# Patient Record
Sex: MALE | Race: WHITE | NOT HISPANIC OR LATINO | Employment: UNEMPLOYED | URBAN - NONMETROPOLITAN AREA
[De-identification: names, ages, dates, MRNs, and addresses within clinical notes are randomized per-mention and may not be internally consistent; named-entity substitution may affect disease eponyms.]

---

## 2017-08-16 ENCOUNTER — APPOINTMENT (OUTPATIENT)
Dept: ULTRASOUND IMAGING | Facility: HOSPITAL | Age: 62
End: 2017-08-16

## 2017-08-21 ENCOUNTER — HOSPITAL ENCOUNTER (OUTPATIENT)
Dept: ULTRASOUND IMAGING | Facility: HOSPITAL | Age: 62
End: 2017-08-21

## 2017-08-24 ENCOUNTER — APPOINTMENT (OUTPATIENT)
Dept: ULTRASOUND IMAGING | Facility: HOSPITAL | Age: 62
End: 2017-08-24

## 2017-08-24 ENCOUNTER — HOSPITAL ENCOUNTER (OUTPATIENT)
Dept: ULTRASOUND IMAGING | Facility: HOSPITAL | Age: 62
Discharge: HOME | End: 2017-08-24
Attending: FAMILY MEDICINE | Admitting: FAMILY MEDICINE

## 2017-08-24 ENCOUNTER — TRANSCRIBE ORDERS (OUTPATIENT)
Dept: GENERAL RADIOLOGY | Facility: HOSPITAL | Age: 62
End: 2017-08-24

## 2017-08-24 DIAGNOSIS — R31.0 GROSS HEMATURIA: ICD-10-CM

## 2017-08-24 DIAGNOSIS — R31.0 GROSS HEMATURIA: Primary | ICD-10-CM

## 2017-08-24 PROCEDURE — 76775 US EXAM ABDO BACK WALL LIM: CPT

## 2018-10-12 ENCOUNTER — HOSPITAL ENCOUNTER (OUTPATIENT)
Dept: MRI IMAGING | Facility: HOSPITAL | Age: 63
Discharge: HOME | End: 2018-10-12
Admitting: OPHTHALMOLOGY

## 2018-10-12 DIAGNOSIS — H46.01 OPTIC PAPILLITIS OF RIGHT EYE: ICD-10-CM

## 2018-10-12 PROCEDURE — 70543 MRI ORBT/FAC/NCK W/O &W/DYE: CPT

## 2018-10-12 PROCEDURE — A9576 INJ PROHANCE MULTIPACK: HCPCS | Performed by: OPHTHALMOLOGY

## 2018-10-12 PROCEDURE — 25010000002 GADOTERIDOL PER 1 ML: Performed by: OPHTHALMOLOGY

## 2018-10-12 RX ADMIN — GADOTERIDOL 20 ML: 279.3 INJECTION, SOLUTION INTRAVENOUS at 13:28

## 2020-03-24 ENCOUNTER — APPOINTMENT (OUTPATIENT)
Dept: ONCOLOGY | Facility: CLINIC | Age: 65
End: 2020-03-24

## 2020-03-24 ENCOUNTER — APPOINTMENT (OUTPATIENT)
Dept: ONCOLOGY | Facility: HOSPITAL | Age: 65
End: 2020-03-24

## 2022-03-24 ENCOUNTER — HOSPITAL ENCOUNTER (OUTPATIENT)
Dept: ULTRASOUND IMAGING | Facility: HOSPITAL | Age: 67
End: 2022-03-24

## 2022-03-31 ENCOUNTER — HOSPITAL ENCOUNTER (OUTPATIENT)
Dept: ULTRASOUND IMAGING | Facility: HOSPITAL | Age: 67
Discharge: HOME | End: 2022-03-31
Admitting: NURSE PRACTITIONER

## 2022-03-31 DIAGNOSIS — K42.9 UMBILICAL HERNIA WITHOUT OBSTRUCTION AND WITHOUT GANGRENE: ICD-10-CM

## 2022-03-31 PROCEDURE — 76705 ECHO EXAM OF ABDOMEN: CPT

## 2022-04-15 ENCOUNTER — OFFICE VISIT (OUTPATIENT)
Dept: SURGERY | Facility: CLINIC | Age: 67
End: 2022-04-15

## 2022-04-15 VITALS
WEIGHT: 214 LBS | SYSTOLIC BLOOD PRESSURE: 100 MMHG | TEMPERATURE: 97.4 F | HEART RATE: 93 BPM | HEIGHT: 74 IN | OXYGEN SATURATION: 94 % | DIASTOLIC BLOOD PRESSURE: 70 MMHG | BODY MASS INDEX: 27.46 KG/M2

## 2022-04-15 DIAGNOSIS — K42.9 UMBILICAL HERNIA WITHOUT OBSTRUCTION AND WITHOUT GANGRENE: Primary | ICD-10-CM

## 2022-04-15 PROCEDURE — 99203 OFFICE O/P NEW LOW 30 MIN: CPT | Performed by: SURGERY

## 2022-04-15 RX ORDER — OMEPRAZOLE 20 MG/1
20 TABLET, DELAYED RELEASE ORAL DAILY
COMMUNITY

## 2022-04-15 RX ORDER — IBUPROFEN 800 MG/1
800 TABLET ORAL EVERY 8 HOURS SCHEDULED
COMMUNITY

## 2022-04-15 RX ORDER — CETIRIZINE HYDROCHLORIDE 10 MG/1
1 TABLET ORAL DAILY
COMMUNITY

## 2022-04-15 RX ORDER — BUPIVACAINE HCL/0.9 % NACL/PF 0.1 %
2 PLASTIC BAG, INJECTION (ML) EPIDURAL ONCE
Status: CANCELLED | OUTPATIENT
Start: 2022-05-04 | End: 2022-04-15

## 2022-04-15 RX ORDER — LISINOPRIL 10 MG/1
10 TABLET ORAL DAILY
COMMUNITY

## 2022-04-15 RX ORDER — SIMVASTATIN 20 MG
TABLET ORAL EVERY 24 HOURS
COMMUNITY

## 2022-04-15 RX ORDER — CITALOPRAM 20 MG/1
20 TABLET ORAL 2 TIMES DAILY
COMMUNITY

## 2022-04-15 NOTE — PROGRESS NOTES
Chief Complaint   Patient presents with   • Consult     Ref: Bethanycorinne, hernia        HPI  67 year old man with a painful but reducible umbilical hernia. No hx of incarceration or intestinal obstruction.   Past Medical History:   Diagnosis Date   • Acid reflux    • Depression    • Heartburn        Past Surgical History:   Procedure Laterality Date   • DENTAL PROCEDURE      full mouth extraction   • EYE SURGERY Bilateral    • MIDDLE EAR SURGERY Left          Current Outpatient Medications:   •  cetirizine (zyrTEC) 10 MG tablet, Take 1 tablet by mouth Daily., Disp: , Rfl:   •  citalopram (CeleXA) 20 MG tablet, 1 tablet, Disp: , Rfl:   •  ibuprofen (ADVIL,MOTRIN) 800 MG tablet, Every 8 (Eight) Hours., Disp: , Rfl:   •  lisinopril (PRINIVIL,ZESTRIL) 10 MG tablet, Daily., Disp: , Rfl:   •  omeprazole OTC (PrilOSEC OTC) 20 MG EC tablet, Daily., Disp: , Rfl:   •  simvastatin (ZOCOR) 20 MG tablet, Daily., Disp: , Rfl:     Allergies   Allergen Reactions   • Meperidine Unknown - High Severity   • Morphine Unknown - High Severity   • Propoxyphene Unknown - High Severity       Family History   Problem Relation Age of Onset   • Colon cancer Mother    • Early death Father    • Heart attack Father    • Dementia Sister    • Obesity Sister    • Hyperlipidemia Brother    • Memory loss Brother    • Heart block Brother    • Heart disease Brother    • Hyperlipidemia Brother    • Hypertension Brother    • Heart attack Brother    • Heart block Brother    • No Known Problems Brother    • Early death Brother    • Colon cancer Brother    • Drug abuse Brother    • Alcohol abuse Brother    • Alcohol abuse Brother    • Liver cancer Brother    • Liver cancer Brother    • Alcohol abuse Brother        Social History     Socioeconomic History   • Marital status:    Tobacco Use   • Smoking status: Current Every Day Smoker     Packs/day: 1.00     Types: Cigarettes   • Smokeless tobacco: Never Used   Vaping Use   • Vaping Use: Never used   •  Passive vaping exposure: Yes   Substance and Sexual Activity   • Alcohol use: Not Currently     Comment: none in 25 years   • Drug use: Not Currently     Types: Oxycodone     Comment: none in 5 years   • Sexual activity: Defer       Review of Systems   Constitutional: Negative for activity change, appetite change, chills and fever.   HENT: Negative for hearing loss, nosebleeds and trouble swallowing.    Cardiovascular: Negative for chest pain, palpitations and leg swelling.   Gastrointestinal: Positive for abdominal pain. Negative for abdominal distention, anal bleeding, blood in stool, constipation, diarrhea, nausea, rectal pain and vomiting.        Heartburn   Endocrine: Negative for cold intolerance, heat intolerance, polydipsia and polyuria.   Genitourinary: Negative for decreased urine volume, difficulty urinating, dysuria, enuresis, frequency, hematuria and urgency.   Musculoskeletal: Negative for arthralgias, back pain, gait problem, myalgias and neck pain.   Skin: Negative for pallor, rash and wound.   Allergic/Immunologic: Negative for immunocompromised state.   Neurological: Negative for dizziness, seizures, weakness, light-headedness, numbness and headaches.   Psychiatric/Behavioral: Negative for agitation and behavioral problems. The patient is not nervous/anxious.         Depression   All other systems reviewed and are negative.      Physical Exam  Vitals reviewed.   Constitutional:       Appearance: Normal appearance.   HENT:      Head: Normocephalic and atraumatic.   Cardiovascular:      Rate and Rhythm: Normal rate and regular rhythm.      Pulses: Normal pulses.   Pulmonary:      Effort: Pulmonary effort is normal. No respiratory distress.   Abdominal:      General: Abdomen is flat.      Palpations: Abdomen is soft.      Hernia: A hernia (UH is noted) is present.   Musculoskeletal:         General: Normal range of motion.      Cervical back: Normal range of motion and neck supple.   Skin:      "General: Skin is warm and dry.   Neurological:      General: No focal deficit present.      Mental Status: He is alert and oriented to person, place, and time.           ASSESSMENT    Diagnoses and all orders for this visit:    1. Umbilical hernia without obstruction and without gangrene (Primary)  -     Case Request; Standing  -     Case Request    Other orders  -     Follow Anesthesia Guidelines / Standing Orders; Future  -     Provide Chlorhexidine Skin Prep Wipes and Instructions; Future        PLAN    1.Open UH repair with mesh is planned.    The following were discussed with the patient/family:    What are the indications that have led your doctor to the opinion that an operation is necessary?    A symptomatic bulge is present for which operation has been suggested.    What, if any, alternative treatments are available for your condition?    Alternatives to surgery have been explained including watchful waiting, noting that patients who are asymptomatic or \"minimally symptomatic\" may be managed without surgical intervention.    What will be the likely result if you don't have the operation?    Hernias may grow in size, or become tender, incarcerated, or cause intestinal obstruction. While the risk of these events is low, the risk with symptomatic hernias is higher.     What are the basic procedures involved in the operation?    A small incision or incisions are made and the defect is repaired and supported with permanent mesh.     What are the risks?    There are risks of bleeding, infection requiring antibiotics and possibly further surgery, injury to nearby structures, nerve injury, wound complications, recurrence of hernia. The risk of chronic pain may be as high as 10%, although the risk debilitating pain is approximately 2%. Urinary retention requiring catheterization may occur due to spasm of the bladder muscles in 1 in 100, and is more common in elderly males.   Events such as severe bleeding, the need " for blood transfusion(s), heart irregularity or stoppage may occur, but are uncommon.  Bleeding is more common if  blood thinning drugs (such as Warfarin, Asprin, Clopidogrel or Dipyridamole)  are taken. Blood clot in the leg (DVT) causing pain and swelling is possible. In rare cases part of the clot may break off and go to the lungs.   Smoking slows wound healing and affects  the heart, lungs and circulation. Giving up smoking more than 2 weeks before the operation will help reduce the risk.  Any complication may require a prolonged hospitalization, a modified incision or additional surgery.    How is the operation expected to improve your health or quality of life?    Operations will decrease risks of serious events. It will relieve the discomfort of bulging.    Is hospitalization necessary and, if so, how long can you expect to be hospitalized?    The operation is usually performed on an outpatient basis under general anesthesia. Occasionally, overnight stay is necessary due to medical co-morbidities, the nature of the operation, or pain control.    What can you expect during your recovery period?    Incisional pain controlled is controlled with a combination of narcotic and non-narcotic oral pain medicines. The incisional areas may become swollen and bruised.       When can you expect to resume normal activities?    Activities (except lifting and straining) may be resumed within a few days. There is to be no lifting over 5 pounds for 6 weeks.    Are there likely to be residual effects from the operation?    Usually none, although pain and numbness are possible.     All questions were answered. The patient agrees to operation.                This document has been electronically signed by Ian Ruffin MD on April 15, 2022 18:30 CDT

## 2022-04-20 ENCOUNTER — PATIENT ROUNDING (BHMG ONLY) (OUTPATIENT)
Dept: SURGERY | Facility: CLINIC | Age: 67
End: 2022-04-20

## 2022-04-20 NOTE — PROGRESS NOTES
"April 20, 2022    Hello, may I speak with Eliseo Owens? This is Eliseo.     My name is Tiffani Collier    I am a Medical Assistant with [facility]    Before we get started may I verify your date of birth? 1955 Date Of Birth Verified.    I am calling to officially welcome you to our practice and ask about your recent visit. Is this a good time to talk? Yes.    Tell me about your visit with us. What things went well? \"Everything went well.\"    We're always looking for ways to make our patients' experiences even better. Do you have recommendations on ways we may improve? No.    Overall were you satisfied with your first visit to our practice? Yes.     I appreciate you taking the time to speak with me today. Is there anything else I can do for you?  No.      Thank you, and have a great day.    Patient is scheduled for Surgery. Patient instructed to call the office with any questions or concerns.    "

## 2022-05-02 ENCOUNTER — PRE-ADMISSION TESTING (OUTPATIENT)
Dept: PREADMISSION TESTING | Facility: HOSPITAL | Age: 67
End: 2022-05-02

## 2022-05-02 ENCOUNTER — LAB (OUTPATIENT)
Dept: LAB | Facility: HOSPITAL | Age: 67
End: 2022-05-02

## 2022-05-02 VITALS
OXYGEN SATURATION: 95 % | WEIGHT: 217 LBS | HEIGHT: 74 IN | BODY MASS INDEX: 27.85 KG/M2 | DIASTOLIC BLOOD PRESSURE: 68 MMHG | SYSTOLIC BLOOD PRESSURE: 100 MMHG | RESPIRATION RATE: 16 BRPM | HEART RATE: 78 BPM

## 2022-05-02 DIAGNOSIS — Z01.818 PREOP TESTING: Primary | ICD-10-CM

## 2022-05-02 LAB
MRSA DNA SPEC QL NAA+PROBE: NEGATIVE
SARS-COV-2 N GENE RESP QL NAA+PROBE: NOT DETECTED

## 2022-05-02 PROCEDURE — C9803 HOPD COVID-19 SPEC COLLECT: HCPCS

## 2022-05-02 PROCEDURE — 93010 ELECTROCARDIOGRAM REPORT: CPT | Performed by: INTERNAL MEDICINE

## 2022-05-02 PROCEDURE — 93005 ELECTROCARDIOGRAM TRACING: CPT

## 2022-05-02 PROCEDURE — 87635 SARS-COV-2 COVID-19 AMP PRB: CPT

## 2022-05-02 PROCEDURE — 87641 MR-STAPH DNA AMP PROBE: CPT

## 2022-05-02 RX ORDER — ASPIRIN 81 MG/1
81 TABLET ORAL DAILY
COMMUNITY

## 2022-05-02 RX ORDER — SODIUM CHLORIDE, SODIUM GLUCONATE, SODIUM ACETATE, POTASSIUM CHLORIDE AND MAGNESIUM CHLORIDE 526; 502; 368; 37; 30 MG/100ML; MG/100ML; MG/100ML; MG/100ML; MG/100ML
1000 INJECTION, SOLUTION INTRAVENOUS CONTINUOUS PRN
Status: CANCELLED | OUTPATIENT
Start: 2022-05-04

## 2022-05-04 ENCOUNTER — ANESTHESIA (OUTPATIENT)
Dept: PERIOP | Facility: HOSPITAL | Age: 67
End: 2022-05-04

## 2022-05-04 ENCOUNTER — ANESTHESIA EVENT (OUTPATIENT)
Dept: PERIOP | Facility: HOSPITAL | Age: 67
End: 2022-05-04

## 2022-05-04 ENCOUNTER — HOSPITAL ENCOUNTER (EMERGENCY)
Facility: HOSPITAL | Age: 67
Discharge: HOME | End: 2022-05-05
Attending: FAMILY MEDICINE | Admitting: FAMILY MEDICINE

## 2022-05-04 ENCOUNTER — HOSPITAL ENCOUNTER (OUTPATIENT)
Facility: HOSPITAL | Age: 67
Setting detail: HOSPITAL OUTPATIENT SURGERY
Discharge: HOME | End: 2022-05-04
Attending: SURGERY | Admitting: SURGERY

## 2022-05-04 VITALS
OXYGEN SATURATION: 92 % | DIASTOLIC BLOOD PRESSURE: 65 MMHG | TEMPERATURE: 97.4 F | SYSTOLIC BLOOD PRESSURE: 122 MMHG | HEIGHT: 74 IN | WEIGHT: 213.41 LBS | BODY MASS INDEX: 27.39 KG/M2 | RESPIRATION RATE: 18 BRPM | HEART RATE: 89 BPM

## 2022-05-04 DIAGNOSIS — R33.8 ACUTE URINARY RETENTION: Primary | ICD-10-CM

## 2022-05-04 DIAGNOSIS — K42.9 UMBILICAL HERNIA WITHOUT OBSTRUCTION AND WITHOUT GANGRENE: Primary | ICD-10-CM

## 2022-05-04 LAB
AMPHET+METHAMPHET UR QL: NEGATIVE
AMPHETAMINES UR QL: NEGATIVE
BARBITURATES UR QL SCN: NEGATIVE
BENZODIAZ UR QL SCN: NEGATIVE
BUPRENORPHINE SERPL-MCNC: NEGATIVE NG/ML
CANNABINOIDS SERPL QL: NEGATIVE
COCAINE UR QL: NEGATIVE
METHADONE UR QL SCN: NEGATIVE
OPIATES UR QL: NEGATIVE
OXYCODONE UR QL SCN: NEGATIVE
PCP UR QL SCN: NEGATIVE
PROPOXYPH UR QL: NEGATIVE
TRICYCLICS UR QL SCN: NEGATIVE

## 2022-05-04 PROCEDURE — 49585 PR REPAIR UMBILICAL HERN,5+Y/O,REDUC: CPT | Performed by: SURGERY

## 2022-05-04 PROCEDURE — 25010000002 SUCCINYLCHOLINE PER 20 MG: Performed by: NURSE ANESTHETIST, CERTIFIED REGISTERED

## 2022-05-04 PROCEDURE — 94640 AIRWAY INHALATION TREATMENT: CPT

## 2022-05-04 PROCEDURE — 25010000002 PROPOFOL 10 MG/ML EMULSION: Performed by: NURSE ANESTHETIST, CERTIFIED REGISTERED

## 2022-05-04 PROCEDURE — 99283 EMERGENCY DEPT VISIT LOW MDM: CPT

## 2022-05-04 PROCEDURE — 80306 DRUG TEST PRSMV INSTRMNT: CPT | Performed by: ANESTHESIOLOGY

## 2022-05-04 PROCEDURE — 0 LIDOCAINE 1 % SOLUTION: Performed by: NURSE ANESTHETIST, CERTIFIED REGISTERED

## 2022-05-04 PROCEDURE — 25010000002 CEFAZOLIN PER 500 MG: Performed by: SURGERY

## 2022-05-04 PROCEDURE — 25010000002 MIDAZOLAM PER 1 MG: Performed by: NURSE ANESTHETIST, CERTIFIED REGISTERED

## 2022-05-04 PROCEDURE — 25010000002 ONDANSETRON PER 1 MG: Performed by: NURSE ANESTHETIST, CERTIFIED REGISTERED

## 2022-05-04 PROCEDURE — 25010000002 FENTANYL CITRATE (PF) 50 MCG/ML SOLUTION: Performed by: NURSE ANESTHETIST, CERTIFIED REGISTERED

## 2022-05-04 PROCEDURE — 25010000002 NEOSTIGMINE 10 MG/10ML SOLUTION: Performed by: NURSE ANESTHETIST, CERTIFIED REGISTERED

## 2022-05-04 RX ORDER — LIDOCAINE HYDROCHLORIDE 20 MG/ML
INJECTION, SOLUTION INFILTRATION; PERINEURAL AS NEEDED
Status: DISCONTINUED | OUTPATIENT
Start: 2022-05-04 | End: 2022-05-04 | Stop reason: SURG

## 2022-05-04 RX ORDER — ALBUTEROL SULFATE 2.5 MG/3ML
2.5 SOLUTION RESPIRATORY (INHALATION) ONCE
Status: COMPLETED | OUTPATIENT
Start: 2022-05-04 | End: 2022-05-04

## 2022-05-04 RX ORDER — FENTANYL CITRATE 50 UG/ML
INJECTION, SOLUTION INTRAMUSCULAR; INTRAVENOUS AS NEEDED
Status: DISCONTINUED | OUTPATIENT
Start: 2022-05-04 | End: 2022-05-04 | Stop reason: SURG

## 2022-05-04 RX ORDER — BUPIVACAINE HCL/0.9 % NACL/PF 0.1 %
2 PLASTIC BAG, INJECTION (ML) EPIDURAL ONCE
Status: COMPLETED | OUTPATIENT
Start: 2022-05-04 | End: 2022-05-04

## 2022-05-04 RX ORDER — PROPOFOL 10 MG/ML
VIAL (ML) INTRAVENOUS AS NEEDED
Status: DISCONTINUED | OUTPATIENT
Start: 2022-05-04 | End: 2022-05-04 | Stop reason: SURG

## 2022-05-04 RX ORDER — SODIUM CHLORIDE, SODIUM GLUCONATE, SODIUM ACETATE, POTASSIUM CHLORIDE AND MAGNESIUM CHLORIDE 526; 502; 368; 37; 30 MG/100ML; MG/100ML; MG/100ML; MG/100ML; MG/100ML
1000 INJECTION, SOLUTION INTRAVENOUS CONTINUOUS PRN
Status: DISCONTINUED | OUTPATIENT
Start: 2022-05-04 | End: 2022-05-04 | Stop reason: HOSPADM

## 2022-05-04 RX ORDER — HYDRALAZINE HYDROCHLORIDE 20 MG/ML
5 INJECTION INTRAMUSCULAR; INTRAVENOUS
Status: DISCONTINUED | OUTPATIENT
Start: 2022-05-04 | End: 2022-05-04 | Stop reason: HOSPADM

## 2022-05-04 RX ORDER — LABETALOL HYDROCHLORIDE 5 MG/ML
5 INJECTION, SOLUTION INTRAVENOUS
Status: DISCONTINUED | OUTPATIENT
Start: 2022-05-04 | End: 2022-05-04 | Stop reason: HOSPADM

## 2022-05-04 RX ORDER — NEOSTIGMINE METHYLSULFATE 1 MG/ML
INJECTION, SOLUTION INTRAVENOUS AS NEEDED
Status: DISCONTINUED | OUTPATIENT
Start: 2022-05-04 | End: 2022-05-04 | Stop reason: SURG

## 2022-05-04 RX ORDER — NALOXONE HCL 0.4 MG/ML
0.4 VIAL (ML) INJECTION AS NEEDED
Status: DISCONTINUED | OUTPATIENT
Start: 2022-05-04 | End: 2022-05-04 | Stop reason: HOSPADM

## 2022-05-04 RX ORDER — ALBUTEROL SULFATE 2.5 MG/3ML
2.5 SOLUTION RESPIRATORY (INHALATION) ONCE AS NEEDED
Status: DISCONTINUED | OUTPATIENT
Start: 2022-05-04 | End: 2022-05-04 | Stop reason: HOSPADM

## 2022-05-04 RX ORDER — EPHEDRINE SULFATE 50 MG/ML
INJECTION, SOLUTION INTRAVENOUS AS NEEDED
Status: DISCONTINUED | OUTPATIENT
Start: 2022-05-04 | End: 2022-05-04 | Stop reason: SURG

## 2022-05-04 RX ORDER — ONDANSETRON 2 MG/ML
INJECTION INTRAMUSCULAR; INTRAVENOUS AS NEEDED
Status: DISCONTINUED | OUTPATIENT
Start: 2022-05-04 | End: 2022-05-04 | Stop reason: SURG

## 2022-05-04 RX ORDER — HYDROCODONE BITARTRATE AND ACETAMINOPHEN 7.5; 325 MG/1; MG/1
1 TABLET ORAL EVERY 4 HOURS PRN
Qty: 15 TABLET | Refills: 0 | Status: SHIPPED | OUTPATIENT
Start: 2022-05-04

## 2022-05-04 RX ORDER — BUPIVACAINE HYDROCHLORIDE 2.5 MG/ML
INJECTION, SOLUTION EPIDURAL; INFILTRATION; INTRACAUDAL AS NEEDED
Status: DISCONTINUED | OUTPATIENT
Start: 2022-05-04 | End: 2022-05-04 | Stop reason: HOSPADM

## 2022-05-04 RX ORDER — MIDAZOLAM HYDROCHLORIDE 1 MG/ML
INJECTION INTRAMUSCULAR; INTRAVENOUS AS NEEDED
Status: DISCONTINUED | OUTPATIENT
Start: 2022-05-04 | End: 2022-05-04 | Stop reason: SURG

## 2022-05-04 RX ORDER — ONDANSETRON 2 MG/ML
4 INJECTION INTRAMUSCULAR; INTRAVENOUS ONCE AS NEEDED
Status: DISCONTINUED | OUTPATIENT
Start: 2022-05-04 | End: 2022-05-04 | Stop reason: HOSPADM

## 2022-05-04 RX ORDER — SUCCINYLCHOLINE CHLORIDE 20 MG/ML
INJECTION INTRAMUSCULAR; INTRAVENOUS AS NEEDED
Status: DISCONTINUED | OUTPATIENT
Start: 2022-05-04 | End: 2022-05-04 | Stop reason: SURG

## 2022-05-04 RX ORDER — ROCURONIUM BROMIDE 10 MG/ML
INJECTION, SOLUTION INTRAVENOUS AS NEEDED
Status: DISCONTINUED | OUTPATIENT
Start: 2022-05-04 | End: 2022-05-04 | Stop reason: SURG

## 2022-05-04 RX ORDER — ACETAMINOPHEN 650 MG/1
650 SUPPOSITORY RECTAL ONCE AS NEEDED
Status: DISCONTINUED | OUTPATIENT
Start: 2022-05-04 | End: 2022-05-04 | Stop reason: HOSPADM

## 2022-05-04 RX ORDER — ACETAMINOPHEN 325 MG/1
650 TABLET ORAL ONCE AS NEEDED
Status: DISCONTINUED | OUTPATIENT
Start: 2022-05-04 | End: 2022-05-04 | Stop reason: HOSPADM

## 2022-05-04 RX ORDER — LIDOCAINE HYDROCHLORIDE 10 MG/ML
INJECTION, SOLUTION INFILTRATION; PERINEURAL AS NEEDED
Status: DISCONTINUED | OUTPATIENT
Start: 2022-05-04 | End: 2022-05-04 | Stop reason: SURG

## 2022-05-04 RX ADMIN — EPHEDRINE SULFATE 10 MG: 50 INJECTION INTRAVENOUS at 12:50

## 2022-05-04 RX ADMIN — Medication 2 G: at 12:42

## 2022-05-04 RX ADMIN — LIDOCAINE HYDROCHLORIDE 100 MG: 20 INJECTION, SOLUTION INFILTRATION; PERINEURAL at 12:35

## 2022-05-04 RX ADMIN — FENTANYL CITRATE 50 MCG: 50 INJECTION INTRAMUSCULAR; INTRAVENOUS at 13:01

## 2022-05-04 RX ADMIN — NEOSTIGMINE METHYLSULFATE 3 MG: 0.5 INJECTION INTRAVENOUS at 13:15

## 2022-05-04 RX ADMIN — ALBUTEROL SULFATE 2.5 MG: 2.5 SOLUTION RESPIRATORY (INHALATION) at 11:06

## 2022-05-04 RX ADMIN — ROCURONIUM BROMIDE 10 MG: 10 INJECTION INTRAVENOUS at 12:35

## 2022-05-04 RX ADMIN — PROPOFOL 140 MG: 10 INJECTION, EMULSION INTRAVENOUS at 12:35

## 2022-05-04 RX ADMIN — MIDAZOLAM HYDROCHLORIDE 2 MG: 1 INJECTION, SOLUTION INTRAMUSCULAR; INTRAVENOUS at 12:27

## 2022-05-04 RX ADMIN — SODIUM CHLORIDE, SODIUM GLUCONATE, SODIUM ACETATE, POTASSIUM CHLORIDE AND MAGNESIUM CHLORIDE 1000 ML: 526; 502; 368; 37; 30 INJECTION, SOLUTION INTRAVENOUS at 11:06

## 2022-05-04 RX ADMIN — ONDANSETRON 4 MG: 2 INJECTION INTRAMUSCULAR; INTRAVENOUS at 13:12

## 2022-05-04 RX ADMIN — FENTANYL CITRATE 50 MCG: 50 INJECTION INTRAMUSCULAR; INTRAVENOUS at 12:32

## 2022-05-04 RX ADMIN — LIDOCAINE HYDROCHLORIDE 50 MG: 10 INJECTION, SOLUTION INFILTRATION; PERINEURAL at 13:17

## 2022-05-04 RX ADMIN — SUCCINYLCHOLINE CHLORIDE 160 MG: 20 INJECTION, SOLUTION INTRAMUSCULAR; INTRAVENOUS at 12:36

## 2022-05-04 RX ADMIN — ROCURONIUM BROMIDE 30 MG: 10 INJECTION INTRAVENOUS at 12:42

## 2022-05-04 RX ADMIN — EPHEDRINE SULFATE 10 MG: 50 INJECTION INTRAVENOUS at 13:22

## 2022-05-04 RX ADMIN — SODIUM CHLORIDE, SODIUM GLUCONATE, SODIUM ACETATE, POTASSIUM CHLORIDE AND MAGNESIUM CHLORIDE: 526; 502; 368; 37; 30 INJECTION, SOLUTION INTRAVENOUS at 12:57

## 2022-05-04 RX ADMIN — GLYCOPYRROLATE 0.4 MG: 0.2 INJECTION, SOLUTION INTRAMUSCULAR; INTRAVITREAL at 13:15

## 2022-05-04 NOTE — OP NOTE
UMBILICAL HERNIA REPAIR  Procedure Note    Eliseo Owens  5/4/2022    Pre-op Diagnosis:   Umbilical hernia without obstruction and without gangrene [K42.9]    Post-op Diagnosis:     Umbilical hernia without obstruction and without gangrene [K42.9]    Procedure:  PRIMARY UMBILICAL HERNIA REPAIR    Surgeon:  Ian Ruffin MD    Resident Surgeon:  Shay Jennings MD    Anesthesia: General    Staff:   Circulator: Suha Zavala RN  Scrub Person: Red Barba  Assistant: Azalea Carpenter    Assistant: Azalea Carpenter was responsible for performing the following activities: Retraction, Suction, Suturing, Closing and Placing Dressing and their skilled assistance was necessary for the success of this case.     Estimated Blood Loss: none    Specimens:                None      Drains: * No LDAs found *    Findings: 1.5 cm umbilical hernia with reducible preperitoneal fat    Complications: None    Indications: This man is 67 years old and is seen today with a symptomatic umbilical hernia.  The patient is brought to surgery today for open repair.    Description of procedure: The patient is brought to the operating room and placed supine on the operating table.  After adequate, general endotracheal anesthesia, the abdominal area is prepped and draped in a sterile manner.  A briefing and timeout are performed and all parties are in agreement.    A transverse infra-umbilical half-moon skin incision was made and carried into the subcutaneous tissue.  The dissection was carried to the fascia and the umbilical stalk was exposed.  It was grasped with an Allis and completely encircled with a right angle clamp.  The umbilical stalk was divided revealing a 1.5 centimeter umbilical hernia.  The hernia sac contained preperitoneal fat.     The hernia sac was debrided from the umbilical fascia exposing good fascia on all sides. The fascia was then closed transversely with interrupted #1 PDS suture in a pants over vest manner.       The umbilicus was then tacked to the subcutaneous tissue with 3-0 Vicryl suture.  The skin was brought together with interrupted 3-0 Vicryl deep, interrupted 3-0 Vicryl on the subcutaneous tissue, and continuous 4-0 subcuticular Vicryl on skin.    The procedure was terminated.  He tolerated well.  Sponge and needle counts were correct.  He was  transferred to recovery room in satisfactory condition.          This document has been electronically signed by Ian Ruffin MD on May 4, 2022 13:23 CDT

## 2022-05-04 NOTE — ANESTHESIA POSTPROCEDURE EVALUATION
Patient: Eliseo Owens    Procedure Summary     Date: 05/04/22 Room / Location: [facility] OR 03 / [facility] OR    Anesthesia Start: 1229 Anesthesia Stop: 1334    Procedure: UMBILICAL HERNIA REPAIR (N/A Abdomen) Diagnosis:       Umbilical hernia without obstruction and without gangrene      (Umbilical hernia without obstruction and without gangrene [K42.9])    Surgeons: Ian Ruffin MD Provider: Yu Nunes CRNA    Anesthesia Type: general ASA Status: 3          Anesthesia Type: general    Vitals  No vitals data found for the desired time range.          Post Anesthesia Care and Evaluation    Patient location during evaluation: PACU  Patient participation: complete - patient participated  Level of consciousness: awake  Pain score: 0  Pain management: adequate  Airway patency: patent  Anesthetic complications: No anesthetic complications  PONV Status: none  Cardiovascular status: acceptable and hemodynamically stable  Respiratory status: acceptable and room air  Hydration status: acceptable    Comments: ---------------------------               05/04/22                      1336        ---------------------------   BP:          134/73        Pulse:         93           Resp:          16           Temp:   97.4 °F (36.3 °C)   SpO2:          93%         ---------------------------

## 2022-05-04 NOTE — ANESTHESIA PREPROCEDURE EVALUATION
Anesthesia Evaluation     Patient summary reviewed and Nursing notes reviewed   no history of anesthetic complications:  NPO Solid Status: > 8 hours  NPO Liquid Status: > 2 hours           Airway   Mallampati: II  TM distance: >3 FB  Neck ROM: full  possible difficult intubation  Dental    (+) edentulous    Pulmonary    (+) a smoker Current Smoked day of surgery, COPD moderate, decreased breath sounds, wheezes,   (-) shortness of breath    PE comment: Albuterol treatment ordered pre-op.  Cardiovascular     ECG reviewed  Rhythm: regular  Rate: normal    (+) hypertension, hyperlipidemia,   (-) MACIAS, murmur    ROS comment: Normal sinus rhythm  Right axis deviation  Incomplete right bundle branch block  Abnormal ECG  When compared with ECG of 26-MAY-2015 19:10,  No significant change was found       Neuro/Psych  (+) psychiatric history Depression,    GI/Hepatic/Renal/Endo    (+) obesity,  GERD well controlled,      Musculoskeletal (-) negative ROS    Abdominal   (+) obese,    Substance History - negative use     OB/GYN negative ob/gyn ROS         Other - negative ROS       ROS/Med Hx Other: Chronic hoarseness secondary to smoking.                  Anesthesia Plan    ASA 3     general     intravenous induction     Anesthetic plan, all risks, benefits, and alternatives have been provided, discussed and informed consent has been obtained with: patient.        CODE STATUS:

## 2022-05-04 NOTE — ANESTHESIA PROCEDURE NOTES
Airway  Urgency: elective    Date/Time: 5/4/2022 12:37 PM  Airway not difficult    General Information and Staff    Patient location during procedure: OR  CRNA/CAA: Yu Nunes, CRNA    Indications and Patient Condition  Indications for airway management: airway protection    Preoxygenated: yes  Mask difficulty assessment: 1 - vent by mask    Final Airway Details  Final airway type: endotracheal airway      Successful airway: ETT  Cuffed: yes   Successful intubation technique: video laryngoscopy  Facilitating devices/methods: intubating stylet  Endotracheal tube insertion site: oral  Blade: Sellers  Blade size: 4  ETT size (mm): 8.0  Cormack-Lehane Classification: grade I - full view of glottis  Placement verified by: chest auscultation and capnometry   Measured from: lips  ETT/EBT  to lips (cm): 22  Number of attempts at approach: 1  Assessment: lips, teeth, and gum same as pre-op and atraumatic intubation

## 2022-05-04 NOTE — INTERVAL H&P NOTE
H&P reviewed. The patient was examined and there are no changes to the H&P.      Temp:  [97.4 °F (36.3 °C)] 97.4 °F (36.3 °C)  Heart Rate:  [80] 80  Resp:  [18] 18  BP: (130)/(77) 130/77

## 2022-05-04 NOTE — DISCHARGE INSTRUCTIONS
Outpatient Surgery, Adult, Care After Hernia Repair  Phone numbers:  [phone] (office)  [phone] (hospital)  [phone] (cell)  These instructions provide you with information about caring for yourself after your procedure. Your treatment has been planned according to current medical practices, but problems sometimes occur. Call me if you have any problems or questions after your procedure.  What can I expect after the procedure?  After the procedure, it is common to have:  Tenderness and numbness at the surgical site.  Swelling and bruising around the surgical site.  Nausea.     Follow these instructions at home:  For at least 24 hours after the procedure:       Do not:  Participate in activities where you could fall or become injured.  Drive.  Use heavy machinery.  Drink alcohol.  Take sleeping pills or medicines that cause drowsiness.  Make important decisions or sign legal documents.  Take care of children on your own.  Rest.  Activity  Do not lift anything that is heavier than 5 lb (2.2 kg) for 6 weeks  Do not play contact sports until your health care provider says it is okay.  Incision care  Make sure you:  Wash your hands with soap and water before you change your bandage (dressing). If soap and water are not available, use hand .  Change your dressing daily for 2 days then may remove and shower.  Leave adhesive strips in place. These skin closures may need to stay in place for 2 weeks or longer. If adhesive strip edges start to loosen and curl up, you may trim the loose edges. Please do not remove adhesive strips.  Check your incision area every day for signs of infection. Check for:  More redness, swelling, or pain.  More fluid or blood.  Warmth.  Pus or a bad smell.  Medicines  Take usual over-the-counter and prescription medicines.  Do not drive or use heavy machinery while taking prescription pain medicines.  Eating and drinking  If you vomit:  Drink water, juice, or soup  when you can drink without vomiting.  Make sure you have little or no nausea before eating solid foods.  Follow the diet recommended by your health care provider.  General instructions       Do not use any tobacco products, such as cigarettes, chewing tobacco, and e-cigarettes, for as long as possible.  If you smoke, do not smoke without supervision.  Keep an ice bag on the inncision  Keep all follow-up visits.  Contact a health care provider if:  You have more redness, swelling, or pain around your incision.  You have more fluid or blood coming from your incision.  Your incision feels warm to the touch.  You have pus or a bad smell coming from your incision.  You have a fever.  You feel light-headed or you faint.  You develop a rash.  You keep feeling nauseous or keep vomiting.  You have very bad pain, even after taking the medicines your health care provider has prescribed or recommended.  You have constipation not responsive to milk of magnesia or magnesium citrate.  Get help right away if:  You have trouble breathing.

## 2022-05-05 ENCOUNTER — TELEPHONE (OUTPATIENT)
Dept: SURGERY | Facility: CLINIC | Age: 67
End: 2022-05-05

## 2022-05-05 VITALS
OXYGEN SATURATION: 90 % | HEIGHT: 74 IN | SYSTOLIC BLOOD PRESSURE: 139 MMHG | DIASTOLIC BLOOD PRESSURE: 82 MMHG | BODY MASS INDEX: 27.21 KG/M2 | WEIGHT: 212 LBS | TEMPERATURE: 98 F | RESPIRATION RATE: 18 BRPM | HEART RATE: 81 BPM

## 2022-05-05 LAB
ALBUMIN SERPL-MCNC: 4.3 G/DL (ref 3.5–5.2)
ALBUMIN/GLOB SERPL: 1.3 G/DL
ALP SERPL-CCNC: 93 U/L (ref 39–117)
ALT SERPL W P-5'-P-CCNC: 22 U/L (ref 1–41)
ANION GAP SERPL CALCULATED.3IONS-SCNC: 11 MMOL/L (ref 5–15)
AST SERPL-CCNC: 20 U/L (ref 1–40)
BACTERIA UR QL AUTO: ABNORMAL /HPF
BASOPHILS # BLD AUTO: 0.1 10*3/MM3 (ref 0–0.2)
BASOPHILS NFR BLD AUTO: 1.1 % (ref 0–1.5)
BILIRUB SERPL-MCNC: 0.4 MG/DL (ref 0–1.2)
BILIRUB UR QL STRIP: NEGATIVE
BUN SERPL-MCNC: 12 MG/DL (ref 8–23)
BUN/CREAT SERPL: 12.4 (ref 7–25)
CALCIUM SPEC-SCNC: 9.8 MG/DL (ref 8.6–10.5)
CHLORIDE SERPL-SCNC: 104 MMOL/L (ref 98–107)
CLARITY UR: CLEAR
CO2 SERPL-SCNC: 27 MMOL/L (ref 22–29)
COLOR UR: ABNORMAL
CREAT SERPL-MCNC: 0.97 MG/DL (ref 0.76–1.27)
DEPRECATED RDW RBC AUTO: 44 FL (ref 37–54)
EGFRCR SERPLBLD CKD-EPI 2021: 86.6 ML/MIN/1.73
EOSINOPHIL # BLD AUTO: 0.27 10*3/MM3 (ref 0–0.4)
EOSINOPHIL NFR BLD AUTO: 3 % (ref 0.3–6.2)
ERYTHROCYTE [DISTWIDTH] IN BLOOD BY AUTOMATED COUNT: 13.3 % (ref 12.3–15.4)
GLOBULIN UR ELPH-MCNC: 3.2 GM/DL
GLUCOSE SERPL-MCNC: 105 MG/DL (ref 65–99)
GLUCOSE UR STRIP-MCNC: NEGATIVE MG/DL
HCT VFR BLD AUTO: 49.9 % (ref 37.5–51)
HGB BLD-MCNC: 16.9 G/DL (ref 13–17.7)
HGB UR QL STRIP.AUTO: ABNORMAL
HOLD SPECIMEN: NORMAL
HYALINE CASTS UR QL AUTO: ABNORMAL /LPF
IMM GRANULOCYTES # BLD AUTO: 0.04 10*3/MM3 (ref 0–0.05)
IMM GRANULOCYTES NFR BLD AUTO: 0.4 % (ref 0–0.5)
KETONES UR QL STRIP: NEGATIVE
LEUKOCYTE ESTERASE UR QL STRIP.AUTO: ABNORMAL
LYMPHOCYTES # BLD AUTO: 2.75 10*3/MM3 (ref 0.7–3.1)
LYMPHOCYTES NFR BLD AUTO: 30.6 % (ref 19.6–45.3)
MCH RBC QN AUTO: 30.5 PG (ref 26.6–33)
MCHC RBC AUTO-ENTMCNC: 33.9 G/DL (ref 31.5–35.7)
MCV RBC AUTO: 90.1 FL (ref 79–97)
MONOCYTES # BLD AUTO: 0.73 10*3/MM3 (ref 0.1–0.9)
MONOCYTES NFR BLD AUTO: 8.1 % (ref 5–12)
NEUTROPHILS NFR BLD AUTO: 5.11 10*3/MM3 (ref 1.7–7)
NEUTROPHILS NFR BLD AUTO: 56.8 % (ref 42.7–76)
NITRITE UR QL STRIP: NEGATIVE
NRBC BLD AUTO-RTO: 0 /100 WBC (ref 0–0.2)
PH UR STRIP.AUTO: 5.5 [PH] (ref 5–9)
PLATELET # BLD AUTO: 188 10*3/MM3 (ref 140–450)
PMV BLD AUTO: 10.3 FL (ref 6–12)
POTASSIUM SERPL-SCNC: 4 MMOL/L (ref 3.5–5.2)
PROT SERPL-MCNC: 7.5 G/DL (ref 6–8.5)
PROT UR QL STRIP: NEGATIVE
RBC # BLD AUTO: 5.54 10*6/MM3 (ref 4.14–5.8)
RBC # UR STRIP: ABNORMAL /HPF
REF LAB TEST METHOD: ABNORMAL
SODIUM SERPL-SCNC: 142 MMOL/L (ref 136–145)
SP GR UR STRIP: 1.01 (ref 1–1.03)
SQUAMOUS #/AREA URNS HPF: ABNORMAL /HPF
UROBILINOGEN UR QL STRIP: ABNORMAL
WBC # UR STRIP: ABNORMAL /HPF
WBC NRBC COR # BLD: 9 10*3/MM3 (ref 3.4–10.8)

## 2022-05-05 PROCEDURE — 51702 INSERT TEMP BLADDER CATH: CPT

## 2022-05-05 PROCEDURE — 80053 COMPREHEN METABOLIC PANEL: CPT | Performed by: FAMILY MEDICINE

## 2022-05-05 PROCEDURE — 81001 URINALYSIS AUTO W/SCOPE: CPT | Performed by: FAMILY MEDICINE

## 2022-05-05 PROCEDURE — 51798 US URINE CAPACITY MEASURE: CPT

## 2022-05-05 PROCEDURE — 85025 COMPLETE CBC W/AUTO DIFF WBC: CPT | Performed by: FAMILY MEDICINE

## 2022-05-05 NOTE — ED NOTES
Education provided on at home cath care, leg bag placed. Pt wife verbalized care and understanding. Stressed importance of cath care and need for follow up appointment.

## 2022-05-05 NOTE — TELEPHONE ENCOUNTER
MR HAIDER HAD TO COME INTO THE EMERGENCT ROOM LAST NIGHT/EARLY THIS MORNING.      THEY PLACED A RICHEY CATHETER AND LEG BAG AND TOLD HIM TO FOLLOW UP IN 2 DAYS.      PLEASE ADVISE     HIS PHONE [phone]

## 2022-05-05 NOTE — ED NOTES
Pt presents to the ED with urinary retention. PT states he had a heria surgery with Dr. Ruffin today and has not been able to urinate since discharge at 3 pm today. Pt states pressure and fullness on the bladder.

## 2022-05-05 NOTE — ED PROVIDER NOTES
Subjective   Patient presents emergency department with acute urinary retention.  He states he last urinated at 4 PM.  He was seen by Dr. Ruffin today and had an umbilical herniorrhaphy performed same day surgery and was discharged home.  Patient has no other complaints.        Urinary Retention  Severity:  Moderate  Onset quality:  Unable to specify  Timing:  Constant  Progression:  Unchanged  Chronicity:  New  Relieved by:  Nothing  Associated symptoms: no abdominal pain, no chest pain, no congestion, no cough, no diarrhea, no ear pain, no fatigue, no fever, no headaches, no myalgias, no nausea, no rash, no rhinorrhea, no shortness of breath, no sore throat, no vomiting and no wheezing        Review of Systems   Constitutional: Negative for appetite change, chills, diaphoresis, fatigue and fever.   HENT: Negative for congestion, ear discharge, ear pain, nosebleeds, rhinorrhea, sinus pressure, sore throat and trouble swallowing.    Eyes: Negative for discharge and redness.   Respiratory: Negative for apnea, cough, chest tightness, shortness of breath and wheezing.    Cardiovascular: Negative for chest pain.   Gastrointestinal: Negative for abdominal pain, diarrhea, nausea and vomiting.   Endocrine: Negative for polyuria.   Genitourinary: Positive for decreased urine volume. Negative for dysuria, frequency and urgency.   Musculoskeletal: Negative for myalgias and neck pain.   Skin: Negative for color change and rash.   Allergic/Immunologic: Negative for immunocompromised state.   Neurological: Negative for dizziness, seizures, syncope, weakness, light-headedness and headaches.   Hematological: Negative for adenopathy. Does not bruise/bleed easily.   Psychiatric/Behavioral: Negative for behavioral problems and confusion.   All other systems reviewed and are negative.      Past Medical History:   Diagnosis Date   • Acid reflux    • Depression    • Environmental allergies    • Heartburn    • Hyperlipidemia    •  Hypertension        Allergies   Allergen Reactions   • Meperidine Unknown - High Severity   • Morphine Unknown - High Severity   • Propoxyphene Unknown - High Severity       Past Surgical History:   Procedure Laterality Date   • DENTAL PROCEDURE      full mouth extraction   • EYE SURGERY Bilateral    • MIDDLE EAR SURGERY Left        Family History   Problem Relation Age of Onset   • Colon cancer Mother    • Early death Father    • Heart attack Father    • Dementia Sister    • Obesity Sister    • Hyperlipidemia Brother    • Memory loss Brother    • Heart block Brother    • Heart disease Brother    • Hyperlipidemia Brother    • Hypertension Brother    • Heart attack Brother    • Heart block Brother    • No Known Problems Brother    • Early death Brother    • Colon cancer Brother    • Drug abuse Brother    • Alcohol abuse Brother    • Alcohol abuse Brother    • Liver cancer Brother    • Liver cancer Brother    • Alcohol abuse Brother        Social History     Socioeconomic History   • Marital status:    Tobacco Use   • Smoking status: Current Every Day Smoker     Packs/day: 1.50     Types: Cigarettes   • Smokeless tobacco: Never Used   Vaping Use   • Vaping Use: Never used   • Passive vaping exposure: Yes   Substance and Sexual Activity   • Alcohol use: Not Currently     Comment: none in 32 years   • Drug use: Not Currently     Types: Oxycodone     Comment: none in 5 years   • Sexual activity: Defer           Objective   Physical Exam  Vitals and nursing note reviewed.   Constitutional:       Appearance: He is well-developed.   HENT:      Head: Normocephalic and atraumatic.      Nose: Nose normal.   Eyes:      General: No scleral icterus.        Right eye: No discharge.         Left eye: No discharge.      Conjunctiva/sclera: Conjunctivae normal.      Pupils: Pupils are equal, round, and reactive to light.   Neck:      Trachea: No tracheal deviation.   Cardiovascular:      Rate and Rhythm: Normal rate and  regular rhythm.      Heart sounds: Normal heart sounds. No murmur heard.  Pulmonary:      Effort: Pulmonary effort is normal. No respiratory distress.      Breath sounds: Normal breath sounds. No stridor. No wheezing or rales.   Abdominal:      General: Bowel sounds are normal. There is no distension.      Palpations: Abdomen is soft. There is no mass.      Tenderness: There is abdominal tenderness in the suprapubic area. There is no guarding or rebound.   Musculoskeletal:      Cervical back: Normal range of motion and neck supple.   Skin:     General: Skin is warm and dry.      Findings: No erythema or rash.   Neurological:      Mental Status: He is alert and oriented to person, place, and time.      Coordination: Coordination normal.   Psychiatric:         Behavior: Behavior normal.         Thought Content: Thought content normal.         Procedures           ED Course  ED Course as of 05/05/22 0254   Thu May 05, 2022   0250 Bedside bladder scan reveals 819 mL of urine in the bladder.  Jameson catheter and leg bag were placed and patient was discharged home and advised to follow-up with Dr. Ruffin 2 days. [CB]      ED Course User Index  [CB] Tomas Hung MD                   Labs Reviewed   COMPREHENSIVE METABOLIC PANEL - Abnormal; Notable for the following components:       Result Value    Glucose 105 (*)     All other components within normal limits    Narrative:     GFR Normal >60  Chronic Kidney Disease <60  Kidney Failure <15     URINALYSIS W/ CULTURE IF INDICATED - Abnormal; Notable for the following components:    Color, UA Orange (*)     Blood, UA Large (3+) (*)     Leuk Esterase, UA Trace (*)     All other components within normal limits   URINALYSIS, MICROSCOPIC ONLY - Abnormal; Notable for the following components:    RBC, UA Too Numerous to Count (*)     All other components within normal limits   CBC WITH AUTO DIFFERENTIAL - Normal   CBC AND DIFFERENTIAL    Narrative:     The following orders were  created for panel order CBC & Differential.  Procedure                               Abnormality         Status                     ---------                               -----------         ------                     CBC Auto Differential[[id number]]        Normal              Final result                 Please view results for these tests on the individual orders.   EXTRA TUBES    Narrative:     The following orders were created for panel order Extra Tubes.  Procedure                               Abnormality         Status                     ---------                               -----------         ------                     Gold Top - [facility]ST[[id number]]                                   Final result                 Please view results for these tests on the individual orders.   GOLD TOP - [facility]       No orders to display                                          MDM    Final diagnoses:   Acute urinary retention       ED Disposition  ED Disposition     ED Disposition   Discharge    Condition   Stable    Comment   --             Ian Ruffin MD  [address] [place] [address] DR  [facility] [address] [place] [address]  [phone]    In 2 days           Medication List      No changes were made to your prescriptions during this visit.          Tomas Hung MD  05/05/22 0254

## 2022-05-07 ENCOUNTER — HOSPITAL ENCOUNTER (EMERGENCY)
Facility: HOSPITAL | Age: 67
Discharge: HOME | End: 2022-05-07
Attending: FAMILY MEDICINE | Admitting: FAMILY MEDICINE

## 2022-05-07 VITALS
DIASTOLIC BLOOD PRESSURE: 86 MMHG | HEART RATE: 101 BPM | BODY MASS INDEX: 27.85 KG/M2 | SYSTOLIC BLOOD PRESSURE: 154 MMHG | OXYGEN SATURATION: 95 % | TEMPERATURE: 95.7 F | HEIGHT: 74 IN | WEIGHT: 217 LBS | RESPIRATION RATE: 20 BRPM

## 2022-05-07 DIAGNOSIS — N99.89 POSTOPERATIVE URINARY RETENTION: Primary | ICD-10-CM

## 2022-05-07 DIAGNOSIS — R33.8 POSTOPERATIVE URINARY RETENTION: Primary | ICD-10-CM

## 2022-05-07 DIAGNOSIS — Z46.6 ENCOUNTER FOR FOLEY CATHETER REMOVAL: ICD-10-CM

## 2022-05-07 PROCEDURE — 99282 EMERGENCY DEPT VISIT SF MDM: CPT

## 2022-05-07 NOTE — ED NOTES
Patient states had urinary catheter placed Wednesday due to urinary retention from hernia surgery, supposed to see MD to have it removed but unable to get into them until next week. States has been peeing around the catheter.

## 2022-05-07 NOTE — ED PROVIDER NOTES
Subjective   Patient in the emergency department requesting his urinary catheter to be removed.  He was seen 3 days ago emergency department was status post hernia repair with urinary retention.  Reports that is draining okay.  He was post follow-up with general surgery but was unable to get in.      History provided by:  Patient   used: No        Review of Systems   Constitutional: Negative for diaphoresis.   HENT: Negative for congestion.    Respiratory: Negative for shortness of breath.    Cardiovascular: Negative for chest pain and palpitations.   Gastrointestinal: Negative for abdominal pain, diarrhea, nausea and vomiting.   Genitourinary: Negative for flank pain, scrotal swelling and testicular pain.   Musculoskeletal: Negative for gait problem.   Skin: Negative for wound.   Neurological: Negative for weakness.   Hematological: Negative for adenopathy.   Psychiatric/Behavioral: Negative for confusion.   All other systems reviewed and are negative.      Past Medical History:   Diagnosis Date   • Acid reflux    • Depression    • Environmental allergies    • Heartburn    • Hyperlipidemia    • Hypertension        Allergies   Allergen Reactions   • Meperidine Unknown - High Severity   • Morphine Unknown - High Severity   • Propoxyphene Unknown - High Severity       Past Surgical History:   Procedure Laterality Date   • DENTAL PROCEDURE      full mouth extraction   • EYE SURGERY Bilateral    • MIDDLE EAR SURGERY Left        Family History   Problem Relation Age of Onset   • Colon cancer Mother    • Early death Father    • Heart attack Father    • Dementia Sister    • Obesity Sister    • Hyperlipidemia Brother    • Memory loss Brother    • Heart block Brother    • Heart disease Brother    • Hyperlipidemia Brother    • Hypertension Brother    • Heart attack Brother    • Heart block Brother    • No Known Problems Brother    • Early death Brother    • Colon cancer Brother    • Drug abuse Brother    •  Alcohol abuse Brother    • Alcohol abuse Brother    • Liver cancer Brother    • Liver cancer Brother    • Alcohol abuse Brother        Social History     Socioeconomic History   • Marital status:    Tobacco Use   • Smoking status: Current Every Day Smoker     Packs/day: 1.50     Types: Cigarettes   • Smokeless tobacco: Never Used   Vaping Use   • Vaping Use: Never used   • Passive vaping exposure: Yes   Substance and Sexual Activity   • Alcohol use: Not Currently     Comment: none in 32 years   • Drug use: Not Currently     Types: Oxycodone     Comment: none in 5 years   • Sexual activity: Defer           Objective   Physical Exam  Vitals and nursing note reviewed.   Constitutional:       Appearance: He is well-developed.   HENT:      Head: Normocephalic.      Nose: Nose normal.   Eyes:      Conjunctiva/sclera: Conjunctivae normal.      Pupils: Pupils are equal, round, and reactive to light.   Cardiovascular:      Rate and Rhythm: Normal rate and regular rhythm.      Heart sounds: Normal heart sounds.   Pulmonary:      Effort: Pulmonary effort is normal.      Breath sounds: Normal breath sounds.   Abdominal:      Palpations: Abdomen is soft.   Musculoskeletal:         General: Normal range of motion.      Cervical back: Normal range of motion.   Skin:     General: Skin is warm and dry.   Neurological:      Mental Status: He is alert and oriented to person, place, and time.      GCS: GCS eye subscore is 4. GCS verbal subscore is 5. GCS motor subscore is 6.         Procedures           ED Course  ED Course as of 05/07/22 0938   Sat May 07, 2022   0930 Urinary cath removed. Pt able to void. No complications noted  [JL]      ED Course User Index  [JL] Luis Cruz, APRN                                                 MDM    Final diagnoses:   Encounter for Jameson catheter removal   Postoperative urinary retention       ED Disposition  ED Disposition     ED Disposition   Discharge    Condition   Stable     Comment   --             Yoselyn Russell, APRN  [address] [place] [address]  [phone]    Call in 1 week           Medication List      No changes were made to your prescriptions during this visit.          Luis Cruz, APRN  05/07/22 2052

## 2022-05-11 ENCOUNTER — OFFICE VISIT (OUTPATIENT)
Dept: SURGERY | Facility: CLINIC | Age: 67
End: 2022-05-11

## 2022-05-11 VITALS
BODY MASS INDEX: 27.16 KG/M2 | HEIGHT: 74 IN | OXYGEN SATURATION: 95 % | TEMPERATURE: 97.4 F | SYSTOLIC BLOOD PRESSURE: 100 MMHG | HEART RATE: 87 BPM | WEIGHT: 211.6 LBS | DIASTOLIC BLOOD PRESSURE: 60 MMHG

## 2022-05-11 DIAGNOSIS — K42.9 UMBILICAL HERNIA WITHOUT OBSTRUCTION AND WITHOUT GANGRENE: Primary | ICD-10-CM

## 2022-05-11 PROCEDURE — 99024 POSTOP FOLLOW-UP VISIT: CPT | Performed by: NURSE PRACTITIONER

## 2022-05-11 NOTE — PROGRESS NOTES
CHIEF COMPLAINT:   Chief Complaint   Patient presents with   • Post-op   • Post-op Hernia     5/4 umbilical       HPI: This patient presents for a post-operative visit after undergoing an primary open umbilical hernia repair by Dr. Ruffin.     Patient reports no problems. Eating well without any significant nausea. Having good bowel function. No problems with constipation or diarrhea. He did have some initial concerns with urinary retention for which he presented to ED and required catheterization. Was referred to Dr. Snow but states he does not wish to be seen by urology at this time. He states he has since been urinating without difficulty.  Denies fever. Ambulating well and slowly returning to normal activities.      PHYSICAL EXAM:  ABD: Incisions are healing well without any erythema or signs of infection. No hernia recurrence is noted.    ASSESSMENT:    Diagnoses and all orders for this visit:    1. Umbilical hernia without obstruction and without gangrene (Primary)        PLAN:  1. The patient will follow-up in 1 month unless any problems arise.  2. May shower.   3. May return to normal activity without restrictions 6 weeks after operation.                  This document has been electronically signed by CALE Jacobsen on May 11, 2022 09:57 CDT

## 2022-05-24 LAB
QT INTERVAL: 402 MS
QTC INTERVAL: 454 MS

## 2022-06-08 ENCOUNTER — OFFICE VISIT (OUTPATIENT)
Dept: SURGERY | Facility: CLINIC | Age: 67
End: 2022-06-08

## 2022-06-08 VITALS
HEIGHT: 74 IN | HEART RATE: 98 BPM | SYSTOLIC BLOOD PRESSURE: 100 MMHG | DIASTOLIC BLOOD PRESSURE: 64 MMHG | WEIGHT: 217.2 LBS | OXYGEN SATURATION: 97 % | BODY MASS INDEX: 27.87 KG/M2

## 2022-06-08 DIAGNOSIS — Z09 STATUS POST UMBILICAL HERNIA REPAIR, FOLLOW-UP EXAM: Primary | ICD-10-CM

## 2022-06-08 PROCEDURE — 99024 POSTOP FOLLOW-UP VISIT: CPT | Performed by: SURGERY

## 2022-06-08 RX ORDER — PREDNISOLONE ACETATE 10 MG/ML
SUSPENSION/ DROPS OPHTHALMIC
COMMUNITY
Start: 2022-05-11

## 2022-06-09 NOTE — PROGRESS NOTES
Chief Complaint   Patient presents with   • Follow-up        HPI  67-year-old man status post umbilical hernia repair with mesh on 5/4/2022.  He is doing well with good appetite, good bowel function and no complaints.  Past Medical History:   Diagnosis Date   • Acid reflux    • Depression    • Environmental allergies    • Heartburn    • Hyperlipidemia    • Hypertension        Past Surgical History:   Procedure Laterality Date   • DENTAL PROCEDURE      full mouth extraction   • EYE SURGERY Bilateral    • MIDDLE EAR SURGERY Left    • UMBILICAL HERNIA REPAIR N/A 5/4/2022    Procedure: UMBILICAL HERNIA REPAIR;  Surgeon: Ian Ruffin MD;  Location: [facility];  Service: General;  Laterality: N/A;         Current Outpatient Medications:   •  aspirin 81 MG EC tablet, Take 81 mg by mouth Daily., Disp: , Rfl:   •  cetirizine (zyrTEC) 10 MG tablet, Take 1 tablet by mouth Daily., Disp: , Rfl:   •  citalopram (CeleXA) 20 MG tablet, Take 20 mg by mouth 2 (Two) Times a Day., Disp: , Rfl:   •  ibuprofen (ADVIL,MOTRIN) 800 MG tablet, Take 800 mg by mouth Every 8 (Eight) Hours., Disp: , Rfl:   •  lisinopril (PRINIVIL,ZESTRIL) 10 MG tablet, Take 10 mg by mouth Daily., Disp: , Rfl:   •  omeprazole OTC (PriLOSEC OTC) 20 MG EC tablet, Take 20 mg by mouth Daily., Disp: , Rfl:   •  prednisoLONE acetate (PRED FORTE) 1 % ophthalmic suspension, , Disp: , Rfl:   •  simvastatin (ZOCOR) 20 MG tablet, Daily., Disp: , Rfl:   •  HYDROcodone-acetaminophen (NORCO) 7.5-325 MG per tablet, Take 1 tablet by mouth Every 4 (Four) Hours As Needed for Moderate Pain  (Pain)., Disp: 15 tablet, Rfl: 0    Allergies   Allergen Reactions   • Hydrocodone-Acetaminophen Unknown - High Severity   • Meperidine Unknown - High Severity   • Morphine Unknown - High Severity   • Propoxyphene Unknown - High Severity       Family History   Problem Relation Age of Onset   • Colon cancer Mother    • Early death Father    • Heart attack Father    • Dementia Sister    • Obesity  Sister    • Hyperlipidemia Brother    • Memory loss Brother    • Heart block Brother    • Heart disease Brother    • Hyperlipidemia Brother    • Hypertension Brother    • Heart attack Brother    • Heart block Brother    • No Known Problems Brother    • Early death Brother    • Colon cancer Brother    • Drug abuse Brother    • Alcohol abuse Brother    • Alcohol abuse Brother    • Liver cancer Brother    • Liver cancer Brother    • Alcohol abuse Brother        Social History     Socioeconomic History   • Marital status:    Tobacco Use   • Smoking status: Current Every Day Smoker     Packs/day: 0.50     Types: Cigarettes   • Smokeless tobacco: Never Used   Vaping Use   • Vaping Use: Never used   • Passive vaping exposure: Yes   Substance and Sexual Activity   • Alcohol use: Not Currently     Comment: none in 32 years   • Drug use: Not Currently     Types: Oxycodone     Comment: none in 30 years   • Sexual activity: Defer           Physical Exam  All wounds are healed nicely.  No evidence of any recurrence, infection, or drainage.    ASSESSMENT    Diagnoses and all orders for this visit:    1. Status post umbilical hernia repair, follow-up exam (Primary)        PLAN    1.  Recheck as needed  2.  May resume all normal activity              This document has been electronically signed by Ian Ruffin MD on June 8, 2022 22:31 CDT

## (undated) DEVICE — 3M™ STERI-STRIP™ REINFORCED ADHESIVE SKIN CLOSURES, R1547, 1/2 IN X 4 IN (12 MM X 100 MM), 6 STRIPS/ENVELOPE: Brand: 3M™ STERI-STRIP™

## (undated) DEVICE — SOL IRR NACL 0.9PCT BT 1000ML

## (undated) DEVICE — SUT VIC 3/0 TIES 18IN J110T

## (undated) DEVICE — STERILE POLYISOPRENE POWDER-FREE SURGICAL GLOVES WITH EMOLLIENT COATING: Brand: PROTEXIS

## (undated) DEVICE — TRAP FLD MINIVAC MEGADYNE 100ML

## (undated) DEVICE — 3M™ IOBAN™ 2 ANTIMICROBIAL INCISE DRAPE 6651EZ: Brand: IOBAN™ 2

## (undated) DEVICE — PK MAJ PROC LF 60

## (undated) DEVICE — ANTIBACTERIAL UNDYED BRAIDED (POLYGLACTIN 910), SYNTHETIC ABSORBABLE SUTURE: Brand: COATED VICRYL

## (undated) DEVICE — CLTH CLENS READYCLEANSE PERI CARE PK/5

## (undated) DEVICE — PATIENT RETURN ELECTRODE, SINGLE-USE, CONTACT QUALITY MONITORING, ADULT, WITH 9FT CORD, FOR PATIENTS WEIGING OVER 33LBS. (15KG): Brand: MEGADYNE

## (undated) DEVICE — ADHS LIQ MASTISOL 2/3ML

## (undated) DEVICE — GLV SURG SIGNATURE ESSENTIAL PF LTX SZ6.5

## (undated) DEVICE — SUT PDS 0 CT2 27IN DYED Z334H

## (undated) DEVICE — DECANT BG O JET

## (undated) DEVICE — SPNG GZ WOVN 4X4IN 12PLY 10/BX STRL

## (undated) DEVICE — DRP WARMR MACH

## (undated) DEVICE — SUT VIC 3/0 SH 27IN J416H

## (undated) DEVICE — TBG PENCL TELESCP MEGADYNE SMOKE EVAC 10FT

## (undated) DEVICE — GLV SURG TRIUMPH PF LTX 6.5 STRL

## (undated) DEVICE — UNDYED BRAIDED (POLYGLACTIN 910), SYNTHETIC ABSORBABLE SUTURE: Brand: COATED VICRYL